# Patient Record
Sex: MALE | Race: OTHER
[De-identification: names, ages, dates, MRNs, and addresses within clinical notes are randomized per-mention and may not be internally consistent; named-entity substitution may affect disease eponyms.]

---

## 2019-12-14 ENCOUNTER — HOSPITAL ENCOUNTER (EMERGENCY)
Dept: HOSPITAL 72 - EMR | Age: 24
LOS: 1 days | Discharge: HOME | End: 2019-12-15
Payer: COMMERCIAL

## 2019-12-14 VITALS — BODY MASS INDEX: 31.5 KG/M2 | WEIGHT: 220 LBS | HEIGHT: 70 IN

## 2019-12-14 VITALS — SYSTOLIC BLOOD PRESSURE: 132 MMHG | DIASTOLIC BLOOD PRESSURE: 79 MMHG
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
## 2019-12-14 DIAGNOSIS — R73.03: ICD-10-CM

## 2019-12-14 DIAGNOSIS — T65.91XA: Primary | ICD-10-CM

## 2019-12-14 PROCEDURE — 99283 EMERGENCY DEPT VISIT LOW MDM: CPT

## 2019-12-14 NOTE — NUR
ED Nurse Note:

Pt is aaox4, vss, no acute distress. Pt brought in by ambualnce 24 c/o 
subastance abuse. Pt stated he took a "bump of white powder" at 2200. Pt became 
unconscious 30 mins after, no head trauma. Pt has pinpoint pupils. 2 rounds of 
narcan given. .

## 2019-12-14 NOTE — EMERGENCY ROOM REPORT
History of Present Illness


General


Chief Complaint:  Substance Abuse


Source:  Patient





Present Illness


HPI


Disclaimer: Please note that this report is being documented using [employer]AG[employer] 

technology. This can lead to erroneous entry secondary to incorrect 

interpretation by the dictating instrument.





HPI: 24-year-old male presents for evaluation by ambulance after possible 

overdose.  He was found unresponsive in a parking lot respirations less than 8, 

pinpoint pupils he was given 2 mg IV Narcan by EMS.  He awoke, respirations 

improved.  He arrives awake and alert.  States he snorted an unknown substance 

earlier in the day.  Has little recollection of the events afterward.  Denies 

any difficulty breathing, wheezing, cough, headache, vision changes, chest pain

, shortness of breath or any other changes in his health.  He only reports 

fatigue and sleepiness.  Reports occasional drug use.  States he does not know 

what substance he took earlier but thought it was cocaine at that time.


 


PMH: Prediabetic


 


PSH: None


 


Allergies: Denies


 


Social Hx: Substance abuse issues


Allergies:  


Coded Allergies:  


     No Known Allergies (Unverified , 12/14/19)





Nursing Documentation-PMH


Past Medical History:  No Stated History





Review of Systems


All Other Systems:  negative except mentioned in HPI





Physical Exam





Vital Signs








  Date Time  Temp Pulse Resp B/P (MAP) Pulse Ox O2 Delivery O2 Flow Rate FiO2


 


12/14/19 22:45 98.8 92 16 120/77 (91) 98 Room Air  





 





General: Awake and alert, no acute distress


HEENT: NC/AT. EOMI. pupils are 4 mm and reactive bilaterally.


Cardiovascular: RRR.  S1 and S2 normal.  No murmur appreciated


Resp: Normal work of breathing. No cough, wheezing or crackles appreciated


Abdomen: Abdomen is soft, nondistended.  Nontender


Skin: Intact.  No abrasions, laceration or rash over the exposed skin


MSK: Normal tone and bulk. Moving all extremities.  No obvious deformity.


Neuro: Awake and alert.  Mentating appropriately.





Medical Decision Making


Diagnostic Impression:  


 Primary Impression:  


 Substance abuse


 Additional Impression:  


 Overdose


ER Course


24-year-old male arrives by ambulance after possible overdose.  He responded 

well to Narcan is now awake and alert without any complaints aside from 

fatigue.  Likely, he accidentally overdosed on an opiate.  He denies any SI/HI.

  He will be monitored in the emergency department but arrives with stable 

vital signs and no acute distress.


Reevaluation Time:  00:53





Last Vital Signs








  Date Time  Temp Pulse Resp B/P (MAP) Pulse Ox O2 Delivery O2 Flow Rate FiO2


 


12/14/19 22:45 98.8 92 16 120/77 (91) 98 Room Air  








Reevaluation Impression


Patient was monitored in the emergency department for approximately 2 hours.  

No episodes of respiratory distress.  Did have episode of emesis but treated 

with Zofran successfully.  Vital signs are stable.  Safe for outpatient follow-

up.  I counseled him on the dangers of drug use and that first responders 

likely saved his life this evening.  I provided resources for drug and alcohol 

addiction.  He can follow-up as an outpatient and return to the emergency 

department any new or worsening symptoms.  He understands and agrees with this 

treatment plan.  His girlfriend and his friend are here to take him home and 

watch him overnight.  Will be discharged.


Disposition:  HOME, SELF-CARE


Condition:  Stable


Scripts


Ondansetron Odt* (ZOFRAN ODT*) 4 Mg Tab.rapdis


4 MG BC EVERY 6 HOURS PRN for Nausea & Vomiting, #10 TAB 0 Refills


   Prov: Asim Lloyd MD         12/15/19











Asim Lloyd MD Dec 14, 2019 22:57

## 2019-12-15 VITALS — SYSTOLIC BLOOD PRESSURE: 116 MMHG | DIASTOLIC BLOOD PRESSURE: 64 MMHG

## 2019-12-15 NOTE — NUR
ER DISCHARGE NOTE:

Patient is cleared to be discharged per ERMD, pt is aox4, on room air, with 
stable vital signs. pt was given dc and prescription instructions, pt was able 
to verbalize understanding, pt id band removed without complications. pt is 
able to ambulate with steady gait. pt took all belongings. Pt left ED with 
friend and girlfriend.